# Patient Record
Sex: MALE | Race: AMERICAN INDIAN OR ALASKA NATIVE | Employment: UNEMPLOYED | URBAN - METROPOLITAN AREA
[De-identification: names, ages, dates, MRNs, and addresses within clinical notes are randomized per-mention and may not be internally consistent; named-entity substitution may affect disease eponyms.]

---

## 2017-01-01 ENCOUNTER — TRANSFERRED RECORDS (OUTPATIENT)
Dept: MULTI SPECIALTY CLINIC | Facility: CLINIC | Age: 52
End: 2017-01-01

## 2021-01-07 ENCOUNTER — OFFICE VISIT (OUTPATIENT)
Dept: FAMILY MEDICINE | Facility: CLINIC | Age: 56
End: 2021-01-07
Payer: MEDICAID

## 2021-01-07 ENCOUNTER — TELEPHONE (OUTPATIENT)
Dept: FAMILY MEDICINE | Facility: CLINIC | Age: 56
End: 2021-01-07

## 2021-01-07 VITALS
WEIGHT: 276 LBS | TEMPERATURE: 97.6 F | SYSTOLIC BLOOD PRESSURE: 173 MMHG | OXYGEN SATURATION: 97 % | HEART RATE: 87 BPM | DIASTOLIC BLOOD PRESSURE: 113 MMHG | BODY MASS INDEX: 38.64 KG/M2 | HEIGHT: 71 IN

## 2021-01-07 DIAGNOSIS — E66.01 MORBID OBESITY (H): ICD-10-CM

## 2021-01-07 DIAGNOSIS — I10 HTN, GOAL BELOW 140/90: ICD-10-CM

## 2021-01-07 DIAGNOSIS — E03.9 HYPOTHYROIDISM, UNSPECIFIED TYPE: ICD-10-CM

## 2021-01-07 DIAGNOSIS — E03.9 HYPOTHYROIDISM, UNSPECIFIED TYPE: Primary | ICD-10-CM

## 2021-01-07 DIAGNOSIS — Z12.5 SCREENING FOR PROSTATE CANCER: ICD-10-CM

## 2021-01-07 DIAGNOSIS — E11.9 TYPE 2 DIABETES MELLITUS WITHOUT COMPLICATION, WITHOUT LONG-TERM CURRENT USE OF INSULIN (H): Primary | ICD-10-CM

## 2021-01-07 DIAGNOSIS — E11.49 OTHER DIABETIC NEUROLOGICAL COMPLICATION ASSOCIATED WITH TYPE 2 DIABETES MELLITUS (H): ICD-10-CM

## 2021-01-07 LAB
ALBUMIN SERPL-MCNC: 4 G/DL (ref 3.4–5)
ALP SERPL-CCNC: 109 U/L (ref 40–150)
ALT SERPL W P-5'-P-CCNC: 99 U/L (ref 0–70)
ANION GAP SERPL CALCULATED.3IONS-SCNC: 4 MMOL/L (ref 3–14)
AST SERPL W P-5'-P-CCNC: 53 U/L (ref 0–45)
BILIRUB SERPL-MCNC: 0.4 MG/DL (ref 0.2–1.3)
BUN SERPL-MCNC: 14 MG/DL (ref 7–30)
CALCIUM SERPL-MCNC: 8.8 MG/DL (ref 8.5–10.1)
CHLORIDE SERPL-SCNC: 105 MMOL/L (ref 94–109)
CHOLEST SERPL-MCNC: 261 MG/DL
CO2 SERPL-SCNC: 26 MMOL/L (ref 20–32)
CREAT SERPL-MCNC: 0.77 MG/DL (ref 0.66–1.25)
CREAT UR-MCNC: 231 MG/DL
GFR SERPL CREATININE-BSD FRML MDRD: >90 ML/MIN/{1.73_M2}
GLUCOSE SERPL-MCNC: 167 MG/DL (ref 70–99)
HBA1C MFR BLD: 8.4 % (ref 0–5.6)
HDLC SERPL-MCNC: 33 MG/DL
LDLC SERPL CALC-MCNC: 182 MG/DL
MICROALBUMIN UR-MCNC: 190 MG/L
MICROALBUMIN/CREAT UR: 82.25 MG/G CR (ref 0–17)
NONHDLC SERPL-MCNC: 228 MG/DL
POTASSIUM SERPL-SCNC: 4.4 MMOL/L (ref 3.4–5.3)
PROT SERPL-MCNC: 7.3 G/DL (ref 6.8–8.8)
PSA SERPL-ACNC: 1.48 UG/L (ref 0–4)
SODIUM SERPL-SCNC: 135 MMOL/L (ref 133–144)
T4 FREE SERPL-MCNC: 0.93 NG/DL (ref 0.76–1.46)
TRIGL SERPL-MCNC: 229 MG/DL
TSH SERPL DL<=0.005 MIU/L-ACNC: 12 MU/L (ref 0.4–4)

## 2021-01-07 PROCEDURE — 83036 HEMOGLOBIN GLYCOSYLATED A1C: CPT | Performed by: PHYSICIAN ASSISTANT

## 2021-01-07 PROCEDURE — G0103 PSA SCREENING: HCPCS | Performed by: PHYSICIAN ASSISTANT

## 2021-01-07 PROCEDURE — 36415 COLL VENOUS BLD VENIPUNCTURE: CPT | Performed by: PHYSICIAN ASSISTANT

## 2021-01-07 PROCEDURE — 99204 OFFICE O/P NEW MOD 45 MIN: CPT | Mod: 25 | Performed by: PHYSICIAN ASSISTANT

## 2021-01-07 PROCEDURE — 84443 ASSAY THYROID STIM HORMONE: CPT | Performed by: PHYSICIAN ASSISTANT

## 2021-01-07 PROCEDURE — 80053 COMPREHEN METABOLIC PANEL: CPT | Performed by: PHYSICIAN ASSISTANT

## 2021-01-07 PROCEDURE — 84439 ASSAY OF FREE THYROXINE: CPT | Performed by: PHYSICIAN ASSISTANT

## 2021-01-07 PROCEDURE — 82043 UR ALBUMIN QUANTITATIVE: CPT | Performed by: PHYSICIAN ASSISTANT

## 2021-01-07 PROCEDURE — 80061 LIPID PANEL: CPT | Performed by: PHYSICIAN ASSISTANT

## 2021-01-07 PROCEDURE — 90471 IMMUNIZATION ADMIN: CPT | Performed by: PHYSICIAN ASSISTANT

## 2021-01-07 PROCEDURE — 90715 TDAP VACCINE 7 YRS/> IM: CPT | Performed by: PHYSICIAN ASSISTANT

## 2021-01-07 RX ORDER — GABAPENTIN 300 MG/1
300 CAPSULE ORAL 3 TIMES DAILY
Qty: 90 CAPSULE | Refills: 0 | Status: SHIPPED | OUTPATIENT
Start: 2021-01-07 | End: 2021-02-02

## 2021-01-07 RX ORDER — METFORMIN HCL 500 MG
1000 TABLET, EXTENDED RELEASE 24 HR ORAL 2 TIMES DAILY WITH MEALS
Qty: 360 TABLET | Refills: 0 | Status: SHIPPED | OUTPATIENT
Start: 2021-01-07 | End: 2021-04-06

## 2021-01-07 RX ORDER — HYDROCHLOROTHIAZIDE 25 MG/1
25 TABLET ORAL DAILY
Qty: 90 TABLET | Refills: 0 | Status: SHIPPED | OUTPATIENT
Start: 2021-01-07 | End: 2021-04-06

## 2021-01-07 RX ORDER — LEVOTHYROXINE SODIUM 75 UG/1
75 TABLET ORAL DAILY
Qty: 90 TABLET | Refills: 0 | Status: SHIPPED | OUTPATIENT
Start: 2021-01-07 | End: 2021-04-08

## 2021-01-07 RX ORDER — LISINOPRIL 20 MG/1
20 TABLET ORAL DAILY
Qty: 90 TABLET | Refills: 0 | Status: SHIPPED | OUTPATIENT
Start: 2021-01-07 | End: 2021-04-06

## 2021-01-07 RX ORDER — ATORVASTATIN CALCIUM 20 MG/1
20 TABLET, FILM COATED ORAL DAILY
Qty: 90 TABLET | Refills: 3 | Status: SHIPPED | OUTPATIENT
Start: 2021-01-07 | End: 2021-11-01

## 2021-01-07 ASSESSMENT — MIFFLIN-ST. JEOR: SCORE: 2111.93

## 2021-01-07 NOTE — TELEPHONE ENCOUNTER
Reason for Call:  Other     Detailed comments:  Patient is calling to give Christal Choudhary the amount of his thyroid medication he is taking.  Levothyroxine 75mg.     Phone Number Patient can be reached at: Home number on file [phone] (home)    Best Time: any    Can we leave a detailed message on this number? YES    Call taken on 1/7/2021 at 12:02 PM by Jillian Godinez

## 2021-01-07 NOTE — NURSING NOTE
Due to injection administration, patient instructed to remain in clinic for 15 minutes  afterwards, and to report any adverse reaction to me immediately.  VIS for Tdap given on same date of administration.  Staff signature/Title: CHANTE Andrade MA

## 2021-01-07 NOTE — PROGRESS NOTES
"  Assessment & Plan     Screening for prostate cancer  - Prostate spec antigen screen    Type 2 diabetes mellitus without complication, without long-term current use of insulin (H)  Uncertain status. Patient with SE's form metformin, trial of extended release first then if still with SE's consider alternatives. Start ASA and atorvastatin as well.   - aspirin (ASA) 81 MG EC tablet; Take 1 tablet (81 mg) by mouth daily  - atorvastatin (LIPITOR) 20 MG tablet; Take 1 tablet (20 mg) by mouth daily  - Lipid panel reflex to direct LDL Fasting  - Comprehensive metabolic panel  - Hemoglobin A1c  - Albumin Random Urine Quantitative with Creat Ratio  - metFORMIN (GLUCOPHAGE-XR) 500 MG 24 hr tablet; Take 2 tablets (1,000 mg) by mouth 2 times daily (with meals)  - EYE ADULT REFERRAL; Future    Hypothyroidism, unspecified type  Uncertain levothyroxine dose. Patient will call in with this information. 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 Follow up 4 weeks.   - gabapentin (NEURONTIN) 300 MG capsule; Take 1 capsule (300 mg) by mouth 3 times daily    HTN, goal below 140/90  Uncontrolled. Restart medicaiton. Dosages unknown may need titration.   - lisinopril (ZESTRIL) 20 MG tablet; Take 1 tablet (20 mg) by mouth daily  - hydrochlorothiazide (HYDRODIURIL) 25 MG tablet; Take 1 tablet (25 mg) by mouth daily    Morbid obesity (H)  Has worked on weight loss. Encouraged continued weight loss.                          Tobacco Cessation:   reports that he has been smoking cigars. He has never used smokeless tobacco.      BMI:   Estimated body mass index is 37.96 kg/m  as calculated from the following:    Height as of this encounter: 1.816 m (5' 11.5\").    Weight as of this encounter: 125.2 kg (276 lb).             Return in about 4 weeks (around 2/4/2021) for Diabetes, BP Recheck.    Christal Choudhary PA-C  [facility] " "[facility]    Subjective     Rob [place] is a 55 year old who presents to clinic today for the following health issues     HPI       New Patient/Transfer of Care    Patient is new to me. He had previously been incarcerated in [place], [place].     He ran out of medications a few weeks ago. Dosages of medications is unknown, he brings a medication list with names no dosages.     Refill medications      How many servings of fruits and vegetables do you eat daily?  0-1    On average, how many sweetened beverages do you drink each day (Examples: soda, juice, sweet tea, etc.  Do NOT count diet or artificially sweetened beverages)?   1    How many days per week do you exercise enough to make your heart beat faster? 7    How many minutes a day do you exercise enough to make your heart beat faster? 20 - 29 to 40 minutes  How many days per week do you miss taking your medication?     What makes it hard for you to take your medications?  does not have any medications.    Colonoscopy-had one in 2017- had polyps-2. Was told to come back in a few years.   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 Enrolled in a re entry program.     Neuropathy in feet, some pain in the right hand. It was on duloxetine, not helpful. Didn't care for it. Has not tried gabapentin.   Having headaches, blurry vision. No chest pain. Some shortness of breath with activity. No swelling in the legs.     Review of Systems   Constitutional, HEENT, cardiovascular, pulmonary, gi and gu systems are negative, except as otherwise noted.      Objective    BP (!) 173/113 (BP Location: Left arm, Patient Position: Chair, Cuff Size: Adult Large)   Pulse 87   Temp 97.6  F (36.4  C) (Oral)   Ht 1.816 m (5' 11.5\")   Wt 125.2 kg (276 lb)   SpO2 97%   BMI 37.96 kg/m    Body mass index is 37.96 kg/m .  Physical Exam   GENERAL: healthy, alert and no distress  RESP: lungs clear to auscultation - no rales, rhonchi or wheezes  CV: " regular rate and rhythm, normal S1 S2, no S3 or S4, no murmur, click or rub, no peripheral edema and peripheral pulses strong  MS: no gross musculoskeletal defects noted, no edema

## 2021-01-08 NOTE — RESULT ENCOUNTER NOTE
Rob,     Here is a copy of your labs. It shows that your cholesterol is high and your sugar level is high. We can discuss treatment changes more at your follow up. For now the best treatment is making sure you are taking all your medications everyday. Please let me know if you have any questions.   Christal Choudhary PA-C

## 2021-01-10 ENCOUNTER — HEALTH MAINTENANCE LETTER (OUTPATIENT)
Age: 56
End: 2021-01-10

## 2021-02-02 ENCOUNTER — OFFICE VISIT (OUTPATIENT)
Dept: FAMILY MEDICINE | Facility: CLINIC | Age: 56
End: 2021-02-02
Payer: MEDICAID

## 2021-02-02 VITALS
HEART RATE: 97 BPM | WEIGHT: 274.03 LBS | DIASTOLIC BLOOD PRESSURE: 69 MMHG | SYSTOLIC BLOOD PRESSURE: 129 MMHG | BODY MASS INDEX: 38.36 KG/M2 | OXYGEN SATURATION: 98 % | TEMPERATURE: 98 F | HEIGHT: 71 IN

## 2021-02-02 DIAGNOSIS — E66.01 MORBID OBESITY (H): ICD-10-CM

## 2021-02-02 DIAGNOSIS — E11.49 OTHER DIABETIC NEUROLOGICAL COMPLICATION ASSOCIATED WITH TYPE 2 DIABETES MELLITUS (H): ICD-10-CM

## 2021-02-02 DIAGNOSIS — R06.83 SNORING: Primary | ICD-10-CM

## 2021-02-02 PROCEDURE — 99214 OFFICE O/P EST MOD 30 MIN: CPT | Performed by: PHYSICIAN ASSISTANT

## 2021-02-02 RX ORDER — GABAPENTIN 300 MG/1
300 CAPSULE ORAL 3 TIMES DAILY
Qty: 90 CAPSULE | Refills: 5 | Status: SHIPPED | OUTPATIENT
Start: 2021-02-02 | End: 2021-10-04

## 2021-02-02 ASSESSMENT — PAIN SCALES - GENERAL: PAINLEVEL: MODERATE PAIN (5)

## 2021-02-02 ASSESSMENT — MIFFLIN-ST. JEOR: SCORE: 2089.87

## 2021-02-02 NOTE — PROGRESS NOTES
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 Will plan a recheck in 3 months and adjust medication dosing as needed.   - gabapentin (NEURONTIN) 300 MG capsule; Take 1 capsule (300 mg) by mouth 3 times daily    Snoring  - SLEEP EVALUATION & MANAGEMENT REFERRAL - [facility] [place]  [phone] (Age 15 and up); Future    Morbid obesity (H)  - SLEEP EVALUATION & MANAGEMENT REFERRAL - [facility]  [phone] (Age 15 and up); Future                       Tobacco Cessation:   reports that he has been smoking cigars. He has never used smokeless tobacco.      BMI:   Estimated body mass index is 38.58 kg/m  as calculated from the following:    Height as of this encounter: 1.795 m (5' 10.67\").    Weight as of this encounter: 124.3 kg (274 lb 0.5 oz).             Return in about 2 months (around 4/2/2021) for Diabetes.    Christal Choudhary PA-C  [facility] HODA [place] is a 55 year old who presents to clinic today for the following health issues     HPI       Diabetes Follow-up      How often are you checking your blood sugar? Not at all    What concerns do you have today about your diabetes? None     Do you have any of these symptoms? (Select all that apply)  Numbness in feet, Burning in feet, Blurry vision and Weight loss    Have you had a diabetic eye exam in the last 12 months? No    The time release has been better with the metformin. No GI side effects.   Numbness has improved by about 1/2 but still there.     Fatigue since starting the gabapentin. TSH had been high too.       BP Readings from Last 2 Encounters:   02/02/21 129/69   01/07/21 (!) 173/113     Hemoglobin A1C (%)   Date Value   01/07/2021 8.4 (H)     LDL Cholesterol Calculated (mg/dL)   Date Value   01/07/2021 182 (H)               Hypertension Follow-up      Do you check your " "blood pressure regularly outside of the clinic? No     Are you following a low salt diet? Yes    Are your blood pressures ever more than 140 on the top number (systolic) OR more   than 90 on the bottom number (diastolic), for example 140/90? No- UNKNOWN      How many servings of fruits and vegetables do you eat daily?  2-3 1-2    On average, how many sweetened beverages do you drink each day (Examples: soda, juice, sweet tea, etc.  Do NOT count diet or artificially sweetened beverages)?   1 pop a week    How many days per week do you exercise enough to make your heart beat faster? 5    How many minutes a day do you exercise enough to make your heart beat faster? 10 - 19    How many days per week do you miss taking your medication? 0    Sleep pattern is bad, snores. People have said he does stop breathing at times.     Had a colonoscopy 2017-was given 5 years. Had this while incarcerated.          Review of Systems   Constitutional, HEENT, cardiovascular, pulmonary, gi and gu systems are negative, except as otherwise noted.      Objective    /69 (BP Location: Left arm, Patient Position: Chair, Cuff Size: Adult Large)   Pulse 97   Temp 98  F (36.7  C) (Oral)   Ht 1.795 m (5' 10.67\")   Wt 124.3 kg (274 lb 0.5 oz)   SpO2 98%   BMI 38.58 kg/m    Body mass index is 38.58 kg/m .  Physical Exam   GENERAL: healthy, alert and no distress        \plain        "

## 2021-02-18 ENCOUNTER — OFFICE VISIT (OUTPATIENT)
Dept: OPTOMETRY | Facility: CLINIC | Age: 56
End: 2021-02-18
Payer: MEDICAID

## 2021-02-18 DIAGNOSIS — H52.01 HYPERMETROPIA, RIGHT: ICD-10-CM

## 2021-02-18 DIAGNOSIS — H52.221 REGULAR ASTIGMATISM OF RIGHT EYE: ICD-10-CM

## 2021-02-18 DIAGNOSIS — H25.13 NUCLEAR AGE-RELATED CATARACT, BOTH EYES: ICD-10-CM

## 2021-02-18 DIAGNOSIS — E11.9 TYPE 2 DIABETES MELLITUS WITHOUT RETINOPATHY (H): ICD-10-CM

## 2021-02-18 DIAGNOSIS — Z01.01 ENCOUNTER FOR EXAMINATION OF EYES AND VISION WITH ABNORMAL FINDINGS: Primary | ICD-10-CM

## 2021-02-18 DIAGNOSIS — H52.4 PRESBYOPIA: ICD-10-CM

## 2021-02-18 PROCEDURE — 92004 COMPRE OPH EXAM NEW PT 1/>: CPT | Performed by: OPTOMETRIST

## 2021-02-18 ASSESSMENT — SLIT LAMP EXAM - LIDS
COMMENTS: NORMAL
COMMENTS: NORMAL

## 2021-02-18 ASSESSMENT — CONF VISUAL FIELD
OD_NORMAL: 1
OS_NORMAL: 1

## 2021-02-18 ASSESSMENT — REFRACTION_MANIFEST
OS_ADD: +2.25
OD_ADD: +2.25
OD_AXIS: 014
OS_CYLINDER: SPHERE
OD_CYLINDER: +0.75
OS_SPHERE: PLANO
OD_SPHERE: +0.50

## 2021-02-18 ASSESSMENT — EXTERNAL EXAM - LEFT EYE: OS_EXAM: NORMAL

## 2021-02-18 ASSESSMENT — VISUAL ACUITY
METHOD: SNELLEN - LINEAR
OS_SC: 20/20
OD_SC: 20/30-

## 2021-02-18 ASSESSMENT — TONOMETRY
OD_IOP_MMHG: 21
OS_IOP_MMHG: 20
IOP_METHOD: APPLANATION

## 2021-02-18 ASSESSMENT — CUP TO DISC RATIO
OD_RATIO: 0.25
OS_RATIO: 0.3

## 2021-02-18 NOTE — PATIENT INSTRUCTIONS
Patient educated on importance of good blood sugar control.  Letter sent to primary care provider with diabetic eye exam report.     You have the start of mild cataracts.  You may notice some blurred vision or glare with night driving.  It is important that you wear good sunglasses to protect your eyes from the ultraviolet light from the sun.     Glasses prescription provided today.   I recommend you try wearing bifocal glasses to help your vision at both distance and near. It typically takes at least 2 weeks to adapt to wearing the bifocal style lenses, so allow yourself time to adjust to the new glasses.     Return in 1 year for comprehensive eye exam, or sooner if needed.     The effects of the dilating drops last for 4- 6 hours.  You will be more sensitive to light and vision will be blurry up close.  Mydriatic sunglasses were given if needed.    Josh Hull, OD  [facility] [place]  [address] [place] [address]. [place]  [address]    [phone]

## 2021-02-18 NOTE — LETTER
2/18/2021         RE: Rob SHERIFF Anthony  [address]  [place] [address]        Dear Colleague,    Thank you for referring your patient, Rob [place], to the [facility]. Please see a copy of my visit note below.    Chief Complaint   Patient presents with     Diabetic Eye Exam       Hemoglobin A1C   Date Value Ref Range Status   01/07/2021 8.4 (H) 0 - 5.6 % Final     Comment:     Normal <5.7% Prediabetes 5.7-6.4%  Diabetes 6.5% or higher - adopted from ADA   consensus guidelines.         Last Eye Exam: 1 year  Dilated Previously: Yes    What are you currently using to see?  readers    Distance Vision Acuity: Noticed gradual change in right eye    Near Vision Acuity: Satisfied with vision while reading  with readers, unsure of power of readers    Eye Comfort: good  Do you use eye drops? : No       Medical, surgical and family histories reviewed and updated 2/18/2021.       OBJECTIVE: See Ophthalmology exam    ASSESSMENT:    ICD-10-CM    1. Encounter for examination of eyes and vision with abnormal findings  Z01.01    2. Type 2 diabetes mellitus without retinopathy (H)  E11.9    3. Nuclear age-related cataract, both eyes  H25.13    4. Hypermetropia, right  H52.01    5. Regular astigmatism of right eye  H52.221    6. Presbyopia  H52.4       PLAN:    Rob [place] aware  eye exam results will be sent to Clinic, [facility].  Patient Instructions   Patient educated on importance of good blood sugar control.  Letter sent to primary care provider with diabetic eye exam report.     You have the start of mild cataracts.  You may notice some blurred vision or glare with night driving.  It is important that you wear good sunglasses to protect your eyes from the ultraviolet light from the sun.     Glasses prescription provided today.   I recommend you try wearing bifocal glasses to help your vision at both distance and near. It typically takes at least 2 weeks to adapt to wearing the  bifocal style lenses, so allow yourself time to adjust to the new glasses.     Return in 1 year for comprehensive eye exam, or sooner if needed.     The effects of the dilating drops last for 4- 6 hours.  You will be more sensitive to light and vision will be blurry up close.  Mydriatic sunglasses were given if needed.    Josh Hull, PAYAL  [address]  [place]  [address]    [phone]                 Again, thank you for allowing me to participate in the care of your patient.        Sincerely,        Josh Hull OD

## 2021-02-18 NOTE — PROGRESS NOTES
Chief Complaint   Patient presents with     Diabetic Eye Exam       Hemoglobin A1C   Date Value Ref Range Status   01/07/2021 8.4 (H) 0 - 5.6 % Final     Comment:     Normal <5.7% Prediabetes 5.7-6.4%  Diabetes 6.5% or higher - adopted from ADA   consensus guidelines.         Last Eye Exam: 1 year  Dilated Previously: Yes    What are you currently using to see?  readers    Distance Vision Acuity: Noticed gradual change in right eye    Near Vision Acuity: Satisfied with vision while reading  with readers, unsure of power of readers    Eye Comfort: good  Do you use eye drops? : No       Medical, surgical and family histories reviewed and updated 2/18/2021.       OBJECTIVE: See Ophthalmology exam    ASSESSMENT:    ICD-10-CM    1. Encounter for examination of eyes and vision with abnormal findings  Z01.01    2. Type 2 diabetes mellitus without retinopathy (H)  E11.9    3. Nuclear age-related cataract, both eyes  H25.13    4. Hypermetropia, right  H52.01    5. Regular astigmatism of right eye  H52.221    6. Presbyopia  H52.4       PLAN:    Rob Cruz aware  eye exam results will be sent to Clinic, [facility].  Patient Instructions   Patient educated on importance of good blood sugar control.  Letter sent to primary care provider with diabetic eye exam report.     You have the start of mild cataracts.  You may notice some blurred vision or glare with night driving.  It is important that you wear good sunglasses to protect your eyes from the ultraviolet light from the sun.     Glasses prescription provided today.   I recommend you try wearing bifocal glasses to help your vision at both distance and near. It typically takes at least 2 weeks to adapt to wearing the bifocal style lenses, so allow yourself time to adjust to the new glasses.     Return in 1 year for comprehensive eye exam, or sooner if needed.     The effects of the dilating drops last for 4- 6 hours.  You will be more sensitive to light and vision  will be blurry up close.  Mydriatic sunglasses were given if needed.    Josh Hull, PAYAL  [address]  [place]  [address] [phone]

## 2021-03-05 ENCOUNTER — APPOINTMENT (OUTPATIENT)
Dept: OPTOMETRY | Facility: CLINIC | Age: 56
End: 2021-03-05
Payer: COMMERCIAL

## 2021-03-05 PROCEDURE — 92341 FIT SPECTACLES BIFOCAL: CPT | Performed by: OPTOMETRIST

## 2021-04-05 DIAGNOSIS — I10 HTN, GOAL BELOW 140/90: ICD-10-CM

## 2021-04-05 DIAGNOSIS — E11.9 TYPE 2 DIABETES MELLITUS WITHOUT COMPLICATION, WITHOUT LONG-TERM CURRENT USE OF INSULIN (H): ICD-10-CM

## 2021-04-05 DIAGNOSIS — E03.9 HYPOTHYROIDISM, UNSPECIFIED TYPE: ICD-10-CM

## 2021-04-06 ENCOUNTER — OFFICE VISIT (OUTPATIENT)
Dept: FAMILY MEDICINE | Facility: CLINIC | Age: 56
End: 2021-04-06
Payer: COMMERCIAL

## 2021-04-06 VITALS
BODY MASS INDEX: 38.43 KG/M2 | DIASTOLIC BLOOD PRESSURE: 74 MMHG | OXYGEN SATURATION: 98 % | WEIGHT: 273 LBS | HEART RATE: 103 BPM | SYSTOLIC BLOOD PRESSURE: 128 MMHG | TEMPERATURE: 98.7 F

## 2021-04-06 DIAGNOSIS — E11.9 TYPE 2 DIABETES MELLITUS WITHOUT COMPLICATION, WITHOUT LONG-TERM CURRENT USE OF INSULIN (H): Primary | ICD-10-CM

## 2021-04-06 DIAGNOSIS — E03.9 HYPOTHYROIDISM, UNSPECIFIED TYPE: ICD-10-CM

## 2021-04-06 DIAGNOSIS — I10 HTN, GOAL BELOW 140/90: ICD-10-CM

## 2021-04-06 PROBLEM — F10.11 NONDEPENDENT ALCOHOL ABUSE, IN REMISSION: Status: ACTIVE | Noted: 2021-04-06

## 2021-04-06 PROBLEM — F17.200 TOBACCO USE DISORDER: Status: ACTIVE | Noted: 2021-04-06

## 2021-04-06 PROBLEM — E11.49 OTHER DIABETIC NEUROLOGICAL COMPLICATION ASSOCIATED WITH TYPE 2 DIABETES MELLITUS (H): Chronic | Status: ACTIVE | Noted: 2021-01-07

## 2021-04-06 PROBLEM — E66.01 MORBID OBESITY (H): Chronic | Status: ACTIVE | Noted: 2021-01-07

## 2021-04-06 PROBLEM — F19.20 CHEMICAL DEPENDENCY (H): Status: ACTIVE | Noted: 2021-04-06

## 2021-04-06 LAB — HBA1C MFR BLD: 7.1 % (ref 0–5.6)

## 2021-04-06 PROCEDURE — 36415 COLL VENOUS BLD VENIPUNCTURE: CPT | Performed by: PHYSICIAN ASSISTANT

## 2021-04-06 PROCEDURE — 84443 ASSAY THYROID STIM HORMONE: CPT | Performed by: PHYSICIAN ASSISTANT

## 2021-04-06 PROCEDURE — 80048 BASIC METABOLIC PNL TOTAL CA: CPT | Performed by: PHYSICIAN ASSISTANT

## 2021-04-06 PROCEDURE — 83036 HEMOGLOBIN GLYCOSYLATED A1C: CPT | Performed by: PHYSICIAN ASSISTANT

## 2021-04-06 PROCEDURE — 99214 OFFICE O/P EST MOD 30 MIN: CPT | Performed by: PHYSICIAN ASSISTANT

## 2021-04-06 PROCEDURE — 84439 ASSAY OF FREE THYROXINE: CPT | Performed by: PHYSICIAN ASSISTANT

## 2021-04-06 RX ORDER — HYDROCHLOROTHIAZIDE 25 MG/1
25 TABLET ORAL DAILY
Qty: 90 TABLET | Refills: 3 | Status: SHIPPED | OUTPATIENT
Start: 2021-04-06 | End: 2022-04-06

## 2021-04-06 RX ORDER — LISINOPRIL 20 MG/1
20 TABLET ORAL DAILY
Qty: 90 TABLET | Refills: 3 | Status: SHIPPED | OUTPATIENT
Start: 2021-04-06 | End: 2022-04-06

## 2021-04-06 RX ORDER — METFORMIN HCL 500 MG
1000 TABLET, EXTENDED RELEASE 24 HR ORAL 2 TIMES DAILY WITH MEALS
Qty: 360 TABLET | Refills: 3 | Status: SHIPPED | OUTPATIENT
Start: 2021-04-06 | End: 2022-04-06

## 2021-04-06 NOTE — PROGRESS NOTES
"    Assessment & Plan     Type 2 diabetes mellitus without complication, without long-term current use of insulin (H)  Recheck today. Tolerating metformin well.   - Hemoglobin A1c  - metFORMIN (GLUCOPHAGE-XR) 500 MG 24 hr tablet; Take 2 tablets (1,000 mg) by mouth 2 times daily (with meals)    Hypothyroidism, unspecified type  Recheck, if TSH still high will increase dose to 88mcg  - TSH with free T4 reflex    HTN, goal below 140/90  Stable. Check electrolytes.   - Basic metabolic panel  - hydrochlorothiazide (HYDRODIURIL) 25 MG tablet; Take 1 tablet (25 mg) by mouth daily  - lisinopril (ZESTRIL) 20 MG tablet; Take 1 tablet (20 mg) by mouth daily             Tobacco Cessation:   reports that he has been smoking cigars. He has never used smokeless tobacco.  Tobacco Cessation Action Plan: Information offered: Patient not interested at this time    BMI:   Estimated body mass index is 38.43 kg/m  as calculated from the following:    Height as of 2/2/21: 1.795 m (5' 10.67\").    Weight as of this encounter: 123.8 kg (273 lb).   Weight management plan: Discussed healthy diet and exercise guidelines        No follow-ups on file.    Christal Choudhary PA-C  [facility] HOD[place] is a 55 year old who presents for the following health issues     HPI     Diabetes Follow-up      How often are you checking your blood sugar? Not at all    What concerns do you have today about your diabetes? None     Do you have any of these symptoms? (Select all that apply)  Numbness in feet and Burning in feet      BP Readings from Last 2 Encounters:   04/06/21 (!) 147/87   02/02/21 129/69     Hemoglobin A1C (%)   Date Value   01/07/2021 8.4 (H)     LDL Cholesterol Calculated (mg/dL)   Date Value   01/07/2021 182 (H)                 How many servings of fruits and vegetables do you eat daily?  2-3    On average, how many sweetened beverages do you drink each day (Examples: soda, juice, sweet tea, etc.  Do NOT count " diet or artificially sweetened beverages)?   1 once in a while     How many days per week do you exercise enough to make your heart beat faster? 5    How many minutes a day do you exercise enough to make your heart beat faster? 30 - 60    How many days per week do you miss taking your medication? 0    He is still fatigued. Not really associated with his gabapentin.   He is still having pain throughout the day. He able to tolerate it at this time. Some days worse than others. Takes Tylenol 1000mg BID.       Review of Systems   Constitutional, HEENT, cardiovascular, pulmonary, gi and gu systems are negative, except as otherwise noted.      Objective    BP (!) 147/87 (BP Location: Right arm, Patient Position: Chair, Cuff Size: Adult Large)   Pulse 103   Temp 98.7  F (37.1  C) (Oral)   Wt 123.8 kg (273 lb)   SpO2 98%   BMI 38.43 kg/m    Body mass index is 38.43 kg/m .  Physical Exam   GENERAL: healthy, alert and no distress  Diabetic foot exam: normal DP and PT pulses, no trophic changes or ulcerative lesions and normal sensory exam

## 2021-04-06 NOTE — PROGRESS NOTES
Rob is a 55 year old who is being evaluated via a billable video visit.      How would you like to obtain your AVS? MyChart  If the video visit is dropped, the invitation should be resent by: Text to cell phone: [phone]  Will anyone else be joining your video visit? Kaitlynn Kaur MA    Video Start Time: 3:34 PM  Video-Visit Details    Type of service:  Video Visit    Video End Time:3:55 PM    Originating Location (pt. Location): Home    Distant Location (provider location):  [facility]     Platform used for Video Visit: Source[employer]       Sleep Consultation:    Date on this visit: 4/7/2021    Primary Physician: Cj Germain     Chief Complaint   Patient presents with     Consult     Difficulty staying asleep at night       Rob Cruz is a 55 year old male with medical history remarkable for hypertension, diabetes type 2, hypothyroidism, hyperlipidemia and obesity. Rob is sent by Christal Choudhary for a sleep consultation regarding snoring.    Rob goes to sleep at 10:00 PM during the week. He wakes up at 5:30 AM with an alarm. He falls asleep in 30 minutes.  Rob denies difficulty falling asleep.  He wakes up 6 times a night for 5 minutes before falling back to sleep.  Rob wakes up to uncertain reasons.  On weekends, Rob goes to sleep at 12:00 AM.  He wakes up at 6:00 AM without an alarm. He falls asleep in 30 minutes.  Patient gets an average of 6 hours of sleep per night. He does not always feel refreshed.     Patient does watch TV in bed.     Rob does not do shift work.      Rob does snore every night and snoring is loud. Patient does not have a regular bed partner. There is report of snoring and gasping.  He does have witnessed apneas  Patient sleeps on his side. He has no morning headaches, morning confusion and restless legs,. Rob denies any bruxism, sleep walking, dream enactment, sleep paralysis, cataplexy and hypnogogic/hypnopompic  hallucinations. He does sleep talk.     Rob denies difficulty breathing through his nose.      Rob has gained 80 pounds in 20 years.  Patient describes themself as a morning person.  He would prefer to go to sleep at 10:00 PM and wake up at 6:00 AM.  Patient's [place] Sleepiness score 0/24 inconsistent with excessive  daytime sleepiness.  He reports fatigue.     Rob naps 0 times per week.  He takes some inadvertant naps.  Patient was counseled on the importance of driving while alert, to pull over if drowsy, or nap before getting into the vehicle if sleepy.  He uses 1 cup of coffee per day.    Allergies:    No Known Allergies    Medications:    Current Outpatient Medications   Medication Sig Dispense Refill     aspirin (ASA) 81 MG EC tablet Take 1 tablet (81 mg) by mouth daily 90 tablet 3     atorvastatin (LIPITOR) 20 MG tablet Take 1 tablet (20 mg) by mouth daily 90 tablet 3     gabapentin (NEURONTIN) 300 MG capsule Take 1 capsule (300 mg) by mouth 3 times daily 90 capsule 5     levothyroxine (SYNTHROID/LEVOTHROID) 75 MCG tablet Take 1 tablet (75 mcg) by mouth daily 90 tablet 0     zolpidem (AMBIEN) 5 MG tablet Take tablet by mouth 15 minutes prior to sleep, for Sleep Study 1 tablet 0     hydrochlorothiazide (HYDRODIURIL) 25 MG tablet Take 1 tablet (25 mg) by mouth daily 90 tablet 3     lisinopril (ZESTRIL) 20 MG tablet Take 1 tablet (20 mg) by mouth daily 90 tablet 3     metFORMIN (GLUCOPHAGE-XR) 500 MG 24 hr tablet Take 2 tablets (1,000 mg) by mouth 2 times daily (with meals) 360 tablet 3       Problem List:  Patient Active Problem List    Diagnosis Date Noted     Chemical dependency (H) 04/06/2021     Priority: Medium     quit 2001       HTN (hypertension), benign 04/06/2021     Priority: Medium     Nondependent alcohol abuse, in remission 04/06/2021     Priority: Medium     Sober        Tobacco use disorder 04/06/2021     Priority: Medium     Type 2 diabetes mellitus without complication, without  long-term current use of insulin (H) 01/07/2021     Priority: Medium     Hypothyroidism, unspecified type 01/07/2021     Priority: Medium     Other diabetic neurological complication associated with type 2 diabetes mellitus (H) 01/07/2021     Priority: Medium     Morbid obesity (H) 01/07/2021     Priority: Medium     Nontoxic multinodular goiter 09/13/2016     Priority: Medium     04/2016: US: Stable multinodular Goiter. Diffuse nodular replacement of both lobes. Complex cystic 2 cm nodule inf left lobe and dominant 2 cm nodule Inf R lobe.  09/2016: US: No change. FNA Lymphocytic thyroiditis       Mixed hyperlipidemia 12/02/2009     Priority: Medium        Past Medical/Surgical History:  Past Medical History:   Diagnosis Date     Diabetes mellitus, type 2 (H)      Past Surgical History:   Procedure Laterality Date     COLONOSCOPY  2017     PE TUBES       TONSILLECTOMY         Social History:  Social History     Socioeconomic History     Marital status: Single     Spouse name: Not on file     Number of children: Not on file     Years of education: Not on file     Highest education level: Not on file   Occupational History     Not on file   Social Needs     Financial resource strain: Not on file     Food insecurity     Worry: Not on file     Inability: Not on file     Transportation needs     Medical: Not on file     Non-medical: Not on file   Tobacco Use     Smoking status: Current Every Day Smoker     Types: Cigars     Smokeless tobacco: Never Used   Substance and Sexual Activity     Alcohol use: Not Currently     Drug use: Not Currently     Sexual activity: Not on file   Lifestyle     Physical activity     Days per week: Not on file     Minutes per session: Not on file     Stress: Not on file   Relationships     Social connections     Talks on phone: Not on file     Gets together: Not on file     Attends Yazidism service: Not on file     Active member of club or organization: Not on file     Attends meetings of  "clubs or organizations: Not on file     Relationship status: Not on file     Intimate partner violence     Fear of current or ex partner: Not on file     Emotionally abused: Not on file     Physically abused: Not on file     Forced sexual activity: Not on file   Other Topics Concern     Not on file   Social History Narrative     Not on file       Family History:  Family History   Problem Relation Age of Onset     Coronary Artery Disease Mother      Coronary Artery Disease Father      Diabetes Maternal Grandmother        Review of Systems:  A complete review of systems reviewed by me is negative with the exeption of what has been mentioned in the history of present illness.  CONSTITUTIONAL:  POSITIVE for  weight gain  EYES: NEGATIVE for changes in vision, blind spots, double vision.  ENT: NEGATIVE for ear pain, sore throat, sinus pain, post-nasal drip, runny nose, bloody nose  CARDIAC: NEGATIVE for fast heartbeats or fluttering in chest, chest pain or pressure, breathlessness when lying flat, swollen legs or swollen feet.  NEUROLOGIC: NEGATIVE headaches, weakness or numbness in the arms or legs.  DERMATOLOGIC: NEGATIVE for rashes, new moles or change in mole(s)  PULMONARY: NEGATIVE SOB at rest, SOB with activity, dry cough, productive cough, coughing up blood, wheezing or whistling when breathing.    GASTROINTESTINAL: NEGATIVE for nausea or vomitting, loose or watery stools, fat or grease in stools, constipation, abdominal pain, bowel movements black in color or blood noted.  GENITOURINARY: NEGATIVE for pain during urination, blood in urine, urinating more frequently than usual, irregular menstrual periods.  MUSCULOSKELETAL: NEGATIVE for muscle pain, bone or joint pain, swollen joints.  ENDOCRINE: NEGATIVE for increased thirst or urination, diabetes.  LYMPHATIC: NEGATIVE for swollen lymph nodes, lumps or bumps in the breasts or nipple discharge.    Physical Examination:  Vitals: Ht 1.795 m (5' 10.67\")   Wt 123.8 kg " (273 lb)   BMI 38.43 kg/m    BMI= Body mass index is 38.43 kg/m .         [place] Total Score 4/6/2021   Total score - [place] 0       CATE Total Score: 28 (04/06/21 1103)    GENERAL APPEARANCE: alert and no distress  EYES: Eyes grossly normal to inspection  RESP: breathing is non-labored  NEURO: mentation intact and speech normal  PSYCH: affect normal/bright    Last Comprehensive Metabolic Panel:  Sodium   Date Value Ref Range Status   04/06/2021 134 133 - 144 mmol/L Final     Potassium   Date Value Ref Range Status   04/06/2021 3.8 3.4 - 5.3 mmol/L Final     Chloride   Date Value Ref Range Status   04/06/2021 104 94 - 109 mmol/L Final     Carbon Dioxide   Date Value Ref Range Status   04/06/2021 22 20 - 32 mmol/L Final     Anion Gap   Date Value Ref Range Status   04/06/2021 8 3 - 14 mmol/L Final     Glucose   Date Value Ref Range Status   04/06/2021 127 (H) 70 - 99 mg/dL Final     Comment:     Non Fasting     Urea Nitrogen   Date Value Ref Range Status   04/06/2021 17 7 - 30 mg/dL Final     Creatinine   Date Value Ref Range Status   04/06/2021 0.96 0.66 - 1.25 mg/dL Final     GFR Estimate   Date Value Ref Range Status   04/06/2021 87 >60 mL/min/[1.73_m2] Final     Comment:     Non  GFR Calc  Starting 12/18/2018, serum creatinine based estimated GFR (eGFR) will be   calculated using the Chronic Kidney Disease Epidemiology Collaboration   (CKD-EPI) equation.       Calcium   Date Value Ref Range Status   04/06/2021 8.3 (L) 8.5 - 10.1 mg/dL Final     TSH   Date Value Ref Range Status   04/06/2021 7.18 (H) 0.40 - 4.00 mU/L Final     Impression:  Patient has features and risk factors for possible obstructive sleep apnea including: BMI of 38, loud snoring, witnessed apnea, non-refreshing sleep,daytime fatigue, difficulty maintaining sleep and co-morbid HTN and DM type 2. The STOP-BANG score is 7/8. The pathophysiology, diagnosis and treatment of CLARENCE was discussed and a handout was provided.   Plan:    1.  Polysomnogram (using 4% desaturation/Medicare/ AASM 1B scoring rules) for high probability obstructive sleep apnea. Ambien if needed.   2. Advised him against drowsy driving.    3. Recommend weight management.     Literature provided regarding sleep apnea.      He will follow up with me in approximately two weeks after his sleep study has been competed to review the results and discuss plan of care.       Polysomnography reviewed.  Obstructive sleep apnea reviewed.  Complications of untreated sleep apnea were reviewed.    Yolanda Ty PA-C    CC: Christal Choudhary

## 2021-04-07 ENCOUNTER — VIRTUAL VISIT (OUTPATIENT)
Dept: SLEEP MEDICINE | Facility: CLINIC | Age: 56
End: 2021-04-07
Attending: PHYSICIAN ASSISTANT
Payer: COMMERCIAL

## 2021-04-07 VITALS — WEIGHT: 273 LBS | BODY MASS INDEX: 38.22 KG/M2 | HEIGHT: 71 IN

## 2021-04-07 DIAGNOSIS — G47.30 SLEEP APNEA, UNSPECIFIED TYPE: ICD-10-CM

## 2021-04-07 DIAGNOSIS — Z72.820 LACK OF ADEQUATE SLEEP: ICD-10-CM

## 2021-04-07 DIAGNOSIS — I10 ESSENTIAL HYPERTENSION: ICD-10-CM

## 2021-04-07 DIAGNOSIS — R06.00 DYSPNEA AND RESPIRATORY ABNORMALITY: ICD-10-CM

## 2021-04-07 DIAGNOSIS — R53.81 MALAISE AND FATIGUE: ICD-10-CM

## 2021-04-07 DIAGNOSIS — E66.01 MORBID OBESITY (H): ICD-10-CM

## 2021-04-07 DIAGNOSIS — R06.89 DYSPNEA AND RESPIRATORY ABNORMALITY: ICD-10-CM

## 2021-04-07 DIAGNOSIS — R53.83 MALAISE AND FATIGUE: ICD-10-CM

## 2021-04-07 DIAGNOSIS — R06.83 SNORING: ICD-10-CM

## 2021-04-07 DIAGNOSIS — F10.11 NONDEPENDENT ALCOHOL ABUSE, IN REMISSION: ICD-10-CM

## 2021-04-07 LAB
ANION GAP SERPL CALCULATED.3IONS-SCNC: 8 MMOL/L (ref 3–14)
BUN SERPL-MCNC: 17 MG/DL (ref 7–30)
CALCIUM SERPL-MCNC: 8.3 MG/DL (ref 8.5–10.1)
CHLORIDE SERPL-SCNC: 104 MMOL/L (ref 94–109)
CO2 SERPL-SCNC: 22 MMOL/L (ref 20–32)
CREAT SERPL-MCNC: 0.96 MG/DL (ref 0.66–1.25)
GFR SERPL CREATININE-BSD FRML MDRD: 87 ML/MIN/{1.73_M2}
GLUCOSE SERPL-MCNC: 127 MG/DL (ref 70–99)
POTASSIUM SERPL-SCNC: 3.8 MMOL/L (ref 3.4–5.3)
SODIUM SERPL-SCNC: 134 MMOL/L (ref 133–144)
T4 FREE SERPL-MCNC: 1.16 NG/DL (ref 0.76–1.46)
TSH SERPL DL<=0.005 MIU/L-ACNC: 7.18 MU/L (ref 0.4–4)

## 2021-04-07 PROCEDURE — 99203 OFFICE O/P NEW LOW 30 MIN: CPT | Mod: 95 | Performed by: PHYSICIAN ASSISTANT

## 2021-04-07 RX ORDER — HYDROCHLOROTHIAZIDE 25 MG/1
TABLET ORAL
Qty: 90 TABLET | Refills: 0 | OUTPATIENT
Start: 2021-04-07

## 2021-04-07 RX ORDER — ZOLPIDEM TARTRATE 5 MG/1
TABLET ORAL
Qty: 1 TABLET | Refills: 0 | Status: SHIPPED | OUTPATIENT
Start: 2021-04-07 | End: 2021-11-01

## 2021-04-07 RX ORDER — LISINOPRIL 20 MG/1
TABLET ORAL
Qty: 90 TABLET | Refills: 0 | OUTPATIENT
Start: 2021-04-07

## 2021-04-07 RX ORDER — METFORMIN HCL 500 MG
TABLET, EXTENDED RELEASE 24 HR ORAL
Qty: 360 TABLET | Refills: 0 | OUTPATIENT
Start: 2021-04-07

## 2021-04-07 ASSESSMENT — MIFFLIN-ST. JEOR: SCORE: 2085.21

## 2021-04-07 NOTE — PATIENT INSTRUCTIONS
Your BMI is Body mass index is 38.43 kg/m .  Weight management is a personal decision.  If you are interested in exploring weight loss strategies, the following discussion covers the approaches that may be successful. Body mass index (BMI) is one way to tell whether you are at a healthy weight, overweight, or obese. It measures your weight in relation to your height.  A BMI of 18.5 to 24.9 is in the healthy range. A person with a BMI of 25 to 29.9 is considered overweight, and someone with a BMI of 30 or greater is considered obese. More than two-thirds of American adults are considered overweight or obese.  Being overweight or obese increases the risk for further weight gain. Excess weight may lead to heart disease and diabetes.  Creating and following plans for healthy eating and physical activity may help you improve your health.  Weight control is part of healthy lifestyle and includes exercise, emotional health, and healthy eating habits. Careful eating habits lifelong are the mainstay of weight control. Though there are significant health benefits from weight loss, long-term weight loss with diet alone may be very difficult to achieve- studies show long-term success with dietary management in less than 10% of people. Attaining a healthy weight may be especially difficult to achieve in those with severe obesity. In some cases, medications, devices and surgical management might be considered.  What can you do?  If you are overweight or obese and are interested in methods for weight loss, you should discuss this with your provider.     Consider reducing daily calorie intake by 500 calories.     Keep a food journal.     Avoiding skipping meals, consider cutting portions instead.    Diet combined with exercise helps maintain muscle while optimizing fat loss. Strength training is particularly important for building and maintaining muscle mass. Exercise helps reduce stress, increase energy, and improves fitness.  Increasing exercise without diet control, however, may not burn enough calories to loose weight.       Start walking three days a week 10-20 minutes at a time    Work towards walking thirty minutes five days a week     Eventually, increase the speed of your walking for 1-2 minutes at time    In addition, we recommend that you review healthy lifestyles and methods for weight loss available through the National Institutes of Health patient information sites:  http://win.niddk.nih.gov/publications/index.htm    And look into health and wellness programs that may be available through your health insurance provider, employer, local community center, or merry club.    Weight management plan: Patient was referred to their PCP to discuss a diet and exercise plan.

## 2021-04-07 NOTE — TELEPHONE ENCOUNTER
Routing refill request to provider for review/approval because:  Labs out of range:    TSH   Date Value Ref Range Status   01/07/2021 12.00 (H) 0.40 - 4.00 mU/L Final       Latosha Gibson, RN, BSN, PHN  [facility]: [place]

## 2021-04-08 DIAGNOSIS — E03.9 HYPOTHYROIDISM, UNSPECIFIED TYPE: ICD-10-CM

## 2021-04-08 RX ORDER — LEVOTHYROXINE SODIUM 75 UG/1
75 TABLET ORAL DAILY
Qty: 90 TABLET | Refills: 3 | Status: SHIPPED | OUTPATIENT
Start: 2021-04-08 | End: 2022-04-06

## 2021-04-08 RX ORDER — LEVOTHYROXINE SODIUM 75 UG/1
TABLET ORAL
Qty: 90 TABLET | Refills: 0 | OUTPATIENT
Start: 2021-04-08

## 2021-04-08 NOTE — RESULT ENCOUNTER NOTE
Rob,     Your sugar level has improved. It is still slightly above your goal of less than 7.0 but I am not going to make any changes right now to your medications. I would encourage you to work on your diet- avoiding the full sugar gatorades etc. And we will recheck in 3 months.   Your thyroid is much closer to normal. I am going to keep you on your same dose and I sent refills to the pharmacy. Let me know if you have questions.   Christal Choudhary PA-C

## 2021-05-21 ENCOUNTER — THERAPY VISIT (OUTPATIENT)
Dept: SLEEP MEDICINE | Facility: CLINIC | Age: 56
End: 2021-05-21
Payer: COMMERCIAL

## 2021-05-21 DIAGNOSIS — R06.83 SNORING: ICD-10-CM

## 2021-05-21 DIAGNOSIS — R53.83 MALAISE AND FATIGUE: ICD-10-CM

## 2021-05-21 DIAGNOSIS — R06.00 DYSPNEA AND RESPIRATORY ABNORMALITY: ICD-10-CM

## 2021-05-21 DIAGNOSIS — E66.01 MORBID OBESITY (H): ICD-10-CM

## 2021-05-21 DIAGNOSIS — G47.30 SLEEP APNEA, UNSPECIFIED TYPE: ICD-10-CM

## 2021-05-21 DIAGNOSIS — R53.81 MALAISE AND FATIGUE: ICD-10-CM

## 2021-05-21 DIAGNOSIS — I10 ESSENTIAL HYPERTENSION: ICD-10-CM

## 2021-05-21 DIAGNOSIS — Z72.820 LACK OF ADEQUATE SLEEP: ICD-10-CM

## 2021-05-21 DIAGNOSIS — R06.89 DYSPNEA AND RESPIRATORY ABNORMALITY: ICD-10-CM

## 2021-05-21 PROCEDURE — 95810 POLYSOM 6/> YRS 4/> PARAM: CPT | Performed by: INTERNAL MEDICINE

## 2021-05-24 PROBLEM — G47.33 OSA (OBSTRUCTIVE SLEEP APNEA): Chronic | Status: ACTIVE | Noted: 2021-05-24

## 2021-05-24 NOTE — PROCEDURES
" SLEEP STUDY INTERPRETATION  DIAGNOSTIC POLYSOMNOGRAPHY REPORT      Patient: BYRON MARRERO  YOB: 1965  Study Date: 5/21/2021  MRN: [id number]  Referring Provider: -  Ordering Provider: Yolanda Ty PA-C    Indications for Polysomnography: The patient is a 55 year old Male who is 5' 10\" and weighs 273.0 lbs. His BMI is 38.6, [place] sleepiness scale 0 and neck circumference is 44 cm. A diagnostic polysomnogram was performed to evaluate for  loud snoring, witnessed apnea, non-refreshing sleep, daytime fatigue, difficulty maintaining sleep and co-morbid HTN and DM type 2.    Polysomnogram Data: A full night polysomnogram recorded the standard physiologic parameters including EEG, EOG, EMG, ECG, nasal and oral airflow. Respiratory parameters of chest and abdominal movements were recorded with respiratory inductance plethysmography. Oxygen saturation was recorded by pulse oximetry. Hypopnea scoring rule used: 1B 4%.    Sleep Architecture:   The total recording time of the polysomnogram was 448.6 minutes. The total sleep time was 386.0 minutes. Sleep latency was normal at 19.9 minutes without the use of a sleep aid. REM latency was 123.0 minutes. Arousal index was increased at 35.9 arousals per hour. Sleep efficiency was normal at 86.1%. Wake after sleep onset was 41.5 minutes. The patient spent 12.0% of total sleep time in Stage N1, 41.8% in Stage N2, 25.4% in Stage N3, and 20.7% in REM. Time in REM supine was 16.0 minutes.    Respiration:     Events ? The polysomnogram revealed a presence of 16 obstructive, 0 central, and 1 mixed apneas resulting in an apnea index of 2.6 events per hour. There were 61 obstructive hypopneas and -0central hypopneas resulting in an obstructive hypopnea index of 9.5 and central hypopnea index of 0 events per hour. The combined apnea/hypopnea index was 12.1 events per hour (central apnea/hypopnea index was 0 events per hour). The REM AHI was 30.0 events per hour. The supine " AHI was 18.9 events per hour. The RERA index was 9.8 events per hour.  The RDI was 21.9 events per hour.    Snoring - was reported as mild to moderate    Respiratory rate and pattern - was notable for normal respiratory rate and pattern.    Sustained Sleep Associated Hypoventilation - Transcutaneous carbon dioxide monitoring was not used, however significant hypoventilation was not suggested by oximetry    Sleep Associated Hypoxemia - (Greater than 5 minutes O2 sat at or below 88%) was not present. Baseline oxygen saturation was 92.5%. Lowest oxygen saturation was 82.1%. Time spent less than or equal to 88% was 2.9 minutes. Time spent less than or equal to 89% was 4.8 minutes.    Movement Activity:     Periodic Limb Activity - There were 0 PLMs during the entire study.     REM EMG Activity - Excessive transient/sustained muscle activity was not present.    Nocturnal Behavior - Abnormal sleep related behaviors were not noted     Bruxism - None apparent.    Cardiac Summary:   The average pulse rate was 77.2 bpm. The minimum pulse rate was 35.9 bpm while the maximum pulse rate was 98.0 bpm.  Arrhythmias were not noted.        Assessment:     Mild obstructive sleep apnea, moderate by RDI    Recommendations:    Based on the presence of mild/moderate obstructive sleep apnea and symptoms or comorbidities, treatment could be empirically initiated with Auto?titrating PAP therapy with a range of 5 to 15 cmH2O. Recommend clinical follow up with sleep management team.    Patient may be a candidate for dental appliance through referral to Sleep Dentistry for the treatment of obstructive sleep apnea and/or socially disruptive snoring.    If devices are not acceptable or effective, patient may benefit from evaluation of possible surgical options. If he is interested, would recommend referral to specialized ENT-Sleep provider.    Advice regarding the risks of drowsy driving.    Suggest optimizing sleep schedule and avoiding sleep  deprivation.    Weight management (if BMI > 30).    Diagnostic Codes:   Obstructive Sleep Apnea G47.33        _____________________________________   Electronically Signed By: Santiago Dennis MD 5/24/21           Range(%) Time in range (min)   0.0 - 89.0 4.8   0.0 - 88.0 2.9         Stage Min(mm Hg) Max(mm Hg)   Wake - -   NREM(1+2+3) - -   REM - -       Range(mmHg) Time in range (min)   55.0 - 100.0 -   Excluded data <20.0 & >65.0 449.0

## 2021-05-27 LAB — SLPCOMP: NORMAL

## 2021-06-02 NOTE — PATIENT INSTRUCTIONS

## 2021-06-02 NOTE — PROGRESS NOTES
Rob is a 55 year old who is being evaluated via a billable video visit.      How would you like to obtain your AVS? MyChart  If the video visit is dropped, the invitation should be resent by: Send to e-mail at:   Will anyone else be joining your video visit? Kaitlynn Cade MA on 6/2/2021 at 2:16 PM    Video Start Time: 2:35 PM  Video-Visit Details    Type of service:  Video Visit    Video End Time:2:47 PM    Originating Location (pt. Location): Home    Distant Location (provider location):  [facility]     Platform used for Video Visit: Doximity       Sleep Study Follow-Up Visit:    Date on this visit: 6/3/2021    Rob Cruz comes in today for follow-up of his sleep study done on 5/21/2021 at the [facility]. A diagnostic polysomnogram was performed to evaluate for  loud snoring, witnessed apnea, non-refreshing sleep, daytime fatigue, difficulty maintaining sleep and co-morbid HTN and DM type 2.    Polysomnogram interpreted by Dr Dennis:  Polysomnogram Data: A full night polysomnogram recorded the standard physiologic parameters including EEG, EOG, EMG, ECG, nasal and oral airflow. Respiratory parameters of chest and abdominal movements were recorded with respiratory inductance plethysmography. Oxygen saturation was recorded by pulse oximetry. Hypopnea scoring rule used: 1B 4%.     Sleep Architecture:   The total recording time of the polysomnogram was 448.6 minutes. The total sleep time was 386.0 minutes. Sleep latency was normal at 19.9 minutes without the use of a sleep aid. REM latency was 123.0 minutes. Arousal index was increased at 35.9 arousals per hour. Sleep efficiency was normal at 86.1%. Wake after sleep onset was 41.5 minutes. The patient spent 12.0% of total sleep time in Stage N1, 41.8% in Stage N2, 25.4% in Stage N3, and 20.7% in REM. Time in REM supine was 16.0 minutes.     Respiration:     Events ? The polysomnogram  revealed a presence of 16 obstructive, 0 central, and 1 mixed apneas resulting in an apnea index of 2.6 events per hour. There were 61 obstructive hypopneas and -0central hypopneas resulting in an obstructive hypopnea index of 9.5 and central hypopnea index of 0 events per hour. The combined apnea/hypopnea index was 12.1 events per hour (central apnea/hypopnea index was 0 events per hour). The REM AHI was 30.0 events per hour. The supine AHI was 18.9 events per hour. The RERA index was 9.8 events per hour.  The RDI was 21.9 events per hour.    Snoring - was reported as mild to moderate    Respiratory rate and pattern - was notable for normal respiratory rate and pattern.    Sustained Sleep Associated Hypoventilation - Transcutaneous carbon dioxide monitoring was not used, however significant hypoventilation was not suggested by oximetry    Sleep Associated Hypoxemia - (Greater than 5 minutes O2 sat at or below 88%) was not present. Baseline oxygen saturation was 92.5%. Lowest oxygen saturation was 82.1%. Time spent less than or equal to 88% was 2.9 minutes. Time spent less than or equal to 89% was 4.8 minutes.     Movement Activity:     Periodic Limb Activity - There were 0 PLMs during the entire study.     REM EMG Activity - Excessive transient/sustained muscle activity was not present.    Nocturnal Behavior - Abnormal sleep related behaviors were not noted     Bruxism - None apparent.     Cardiac Summary:   The average pulse rate was 77.2 bpm. The minimum pulse rate was 35.9 bpm while the maximum pulse rate was 98.0 bpm.  Arrhythmias were not noted.       Past medical/surgical history, family history, social history, medications and allergies were reviewed.      Problem List:  Patient Active Problem List    Diagnosis Date Noted     CLARENCE (obstructive sleep apnea)- mild (AHI 12) 05/24/2021     Priority: Medium     5/21/2021 [facility] Study (273.0 lbs) - AHI 12.1, RDI 21.9, Supine AHI 18.9, REM AHI  30.0, Low O2 82.1%, Time Spent ?88% 2.9 minutes / Time Spent ?89% 4.8 minutes.       Chemical dependency (H) 04/06/2021     Priority: Medium     quit 2001       HTN (hypertension), benign 04/06/2021     Priority: Medium     Nondependent alcohol abuse, in remission 04/06/2021     Priority: Medium     Sober        Tobacco use disorder 04/06/2021     Priority: Medium     Type 2 diabetes mellitus without complication, without long-term current use of insulin (H) 01/07/2021     Priority: Medium     Hypothyroidism, unspecified type 01/07/2021     Priority: Medium     Other diabetic neurological complication associated with type 2 diabetes mellitus (H) 01/07/2021     Priority: Medium     Morbid obesity (H) 01/07/2021     Priority: Medium     Nontoxic multinodular goiter 09/13/2016     Priority: Medium     04/2016: US: Stable multinodular Goiter. Diffuse nodular replacement of both lobes. Complex cystic 2 cm nodule inf left lobe and dominant 2 cm nodule Inf R lobe.  09/2016: US: No change. FNA Lymphocytic thyroiditis       Mixed hyperlipidemia 12/02/2009     Priority: Medium        Impression/Plan:    1. Mild obstructive sleep apnea, moderate by RDI-  Polysomnogram was reviewed in detail today     Counseled the patient that mild sleep apnea is not felt to significantly increase long-term cardiovascular risk, but can contribute to excessive daytime sleepiness.    Treatment options discussed today including, no treatment, auto-CPAP, oral appliance therapy or polysomnography with full night PAP titration.  He reports poor dentition. He wants to consider his options. If he goes with CPAP, I will order auto-CPAP 6-15 cm/H20.    He will follow up with me as needed.     Yolanda Ty PA-C

## 2021-06-03 ENCOUNTER — VIRTUAL VISIT (OUTPATIENT)
Dept: SLEEP MEDICINE | Facility: CLINIC | Age: 56
End: 2021-06-03
Payer: COMMERCIAL

## 2021-06-03 DIAGNOSIS — G47.33 OSA (OBSTRUCTIVE SLEEP APNEA): ICD-10-CM

## 2021-06-03 PROCEDURE — 99214 OFFICE O/P EST MOD 30 MIN: CPT | Mod: 95 | Performed by: PHYSICIAN ASSISTANT

## 2021-08-28 ENCOUNTER — HEALTH MAINTENANCE LETTER (OUTPATIENT)
Age: 56
End: 2021-08-28

## 2021-10-03 DIAGNOSIS — E11.49 OTHER DIABETIC NEUROLOGICAL COMPLICATION ASSOCIATED WITH TYPE 2 DIABETES MELLITUS (H): ICD-10-CM

## 2021-10-03 NOTE — LETTER
October 13, 2021      Rob Cruz  [address] [facility] [place] [address]        Dear Rob,     We have been trying to reach you. Your provider has sent a  irene refill of gabapentin (NEURONTIN) 300 MG capsule. You are due for an appointment for further refills.  Please contact the clinic at [phone] to schedule an appointment.        Sincerely,        Christal Choudhary PA-C

## 2021-10-04 RX ORDER — GABAPENTIN 300 MG/1
CAPSULE ORAL
Qty: 90 CAPSULE | Refills: 0 | Status: SHIPPED | OUTPATIENT
Start: 2021-10-04 | End: 2021-11-01

## 2021-10-04 NOTE — TELEPHONE ENCOUNTER
Routing refill request to provider for review/approval because:  Drug not on the [facility] refill protocol     Sobia Sherman RN  [facility]

## 2021-10-13 NOTE — TELEPHONE ENCOUNTER
Left voice message for pt to give us a callback, please assist with scheduling. Letter sent to address on file.   Elidia PEREZ-

## 2021-10-23 ENCOUNTER — HEALTH MAINTENANCE LETTER (OUTPATIENT)
Age: 56
End: 2021-10-23

## 2021-11-01 ENCOUNTER — OFFICE VISIT (OUTPATIENT)
Dept: FAMILY MEDICINE | Facility: CLINIC | Age: 56
End: 2021-11-01
Payer: COMMERCIAL

## 2021-11-01 VITALS
SYSTOLIC BLOOD PRESSURE: 122 MMHG | HEART RATE: 104 BPM | WEIGHT: 233 LBS | OXYGEN SATURATION: 99 % | BODY MASS INDEX: 32.8 KG/M2 | TEMPERATURE: 98.6 F | DIASTOLIC BLOOD PRESSURE: 64 MMHG

## 2021-11-01 DIAGNOSIS — E03.9 HYPOTHYROIDISM, UNSPECIFIED TYPE: ICD-10-CM

## 2021-11-01 DIAGNOSIS — L60.0 INGROWN TOENAIL: ICD-10-CM

## 2021-11-01 DIAGNOSIS — E11.9 TYPE 2 DIABETES MELLITUS WITHOUT COMPLICATION, WITHOUT LONG-TERM CURRENT USE OF INSULIN (H): Chronic | ICD-10-CM

## 2021-11-01 DIAGNOSIS — E11.49 OTHER DIABETIC NEUROLOGICAL COMPLICATION ASSOCIATED WITH TYPE 2 DIABETES MELLITUS (H): Primary | ICD-10-CM

## 2021-11-01 DIAGNOSIS — F17.200 TOBACCO USE DISORDER: ICD-10-CM

## 2021-11-01 DIAGNOSIS — F19.20 CHEMICAL DEPENDENCY (H): ICD-10-CM

## 2021-11-01 LAB — HBA1C MFR BLD: 6.3 % (ref 0–5.6)

## 2021-11-01 PROCEDURE — 36415 COLL VENOUS BLD VENIPUNCTURE: CPT | Performed by: PHYSICIAN ASSISTANT

## 2021-11-01 PROCEDURE — 99214 OFFICE O/P EST MOD 30 MIN: CPT | Performed by: PHYSICIAN ASSISTANT

## 2021-11-01 PROCEDURE — 83036 HEMOGLOBIN GLYCOSYLATED A1C: CPT | Performed by: PHYSICIAN ASSISTANT

## 2021-11-01 PROCEDURE — 84443 ASSAY THYROID STIM HORMONE: CPT | Performed by: PHYSICIAN ASSISTANT

## 2021-11-01 RX ORDER — GABAPENTIN 300 MG/1
CAPSULE ORAL
Qty: 120 CAPSULE | Refills: 5 | Status: SHIPPED | OUTPATIENT
Start: 2021-11-01

## 2021-11-01 RX ORDER — ATORVASTATIN CALCIUM 20 MG/1
20 TABLET, FILM COATED ORAL DAILY
Qty: 90 TABLET | Refills: 3 | Status: SHIPPED | OUTPATIENT
Start: 2021-11-01

## 2021-11-01 NOTE — PROGRESS NOTES
"  Assessment & Plan     Other diabetic neurological complication associated with type 2 diabetes mellitus (H)  Will do trial of an increase of gabapentin to 900mg at bedtime and 300 qam.   - HEMOGLOBIN A1C; Future  - gabapentin (NEURONTIN) 300 MG capsule; Take 1 tablet in the morning and 3 tablets before bed.    Tobacco use disorder  Encouraged complete cessation.     Type 2 diabetes mellitus without complication, without long-term current use of insulin (H)  Check A1C-great weight loss, he will continue.   - HEMOGLOBIN A1C; Future  - atorvastatin (LIPITOR) 20 MG tablet; Take 1 tablet (20 mg) by mouth daily  - aspirin (ASA) 81 MG EC tablet; Take 1 tablet (81 mg) by mouth daily    Hypothyroidism, unspecified type  Recheck.   - TSH WITH FREE T4 REFLEX; Future    Ingrown toenail  Likely benefit from removal  - Orthopedic  Referral; Future    Chemical dependency (H)  Sober. Congratulated on success.                    Return in about 6 months (around 5/1/2022) for Diabetes.    Christal Choudhary PA-C  [facility]  The student Joanne BEAL acted as a scribe and the encounter documented above was completely performed by myself and the documentation reflects the work I have performed today.   Christal Pope   Rob is a 56 year old who presents for the following health issues     HPI       Patient is here for a diabetes follow up.  He has been working on weight loss through diet - cutting out fast food, cookies, and potato chips.  He has also been very active at work.  He is still sober.  Quit smoking cigarettes 5 months ago, switched to cigars - smokes 2 per day. The gabapentin is \"better than nothing\" and says his neuropathy is improving. No side effects from gabapentin.  His says that his left big toenail is thick and causing discomfort and is unable to cut it at home.       Review of Systems   Constitutional, HEENT, cardiovascular, pulmonary, GI, , musculoskeletal, " neuro, skin, endocrine and psych systems are negative, except as otherwise noted.      Objective    /64   Pulse 104   Temp 98.6  F (37  C) (Oral)   Wt 105.7 kg (233 lb)   SpO2 99%   BMI 32.80 kg/m    Body mass index is 32.8 kg/m .     Physical Exam   GENERAL: healthy, alert and no distress  EYES: Eyes grossly normal to inspection, PERRL and conjunctivae and sclerae normal  HENT: ear canals and TM's normal, nose and mouth without ulcers or lesions  NECK: no adenopathy, no asymmetry, masses, or scars and thyroid normal to palpation  RESP: lungs clear to auscultation - no rales, rhonchi or wheezes  CV: regular rate and rhythm, normal S1 S2, no S3 or S4, no murmur,   ABDOMEN: soft, nontender, no hepatosplenomegaly, no masses and bowel sounds normal  MS: no gross musculoskeletal defects noted, no edema  SKIN: no suspicious lesions or rashes  NEURO: Normal strength and tone, mentation intact and speech normal  PSYCH: mentation appears normal, affect normal/bright  LEFT FOOT: Left thickened big toe nail, growing crooked-pinching skin

## 2021-11-02 LAB — TSH SERPL DL<=0.005 MIU/L-ACNC: 2.92 MU/L (ref 0.4–4)

## 2021-11-02 NOTE — RESULT ENCOUNTER NOTE
Rob,     Your labs look great! We will see you in 6 months. No changes to your medications.   Christal Choudhary PA-C

## 2022-02-12 ENCOUNTER — HEALTH MAINTENANCE LETTER (OUTPATIENT)
Age: 57
End: 2022-02-12

## 2022-04-06 DIAGNOSIS — E11.9 TYPE 2 DIABETES MELLITUS WITHOUT COMPLICATION, WITHOUT LONG-TERM CURRENT USE OF INSULIN (H): ICD-10-CM

## 2022-04-06 DIAGNOSIS — E03.9 HYPOTHYROIDISM, UNSPECIFIED TYPE: ICD-10-CM

## 2022-04-06 DIAGNOSIS — I10 HTN, GOAL BELOW 140/90: ICD-10-CM

## 2022-04-06 RX ORDER — LEVOTHYROXINE SODIUM 75 UG/1
TABLET ORAL
Qty: 90 TABLET | Refills: 0 | Status: SHIPPED | OUTPATIENT
Start: 2022-04-06

## 2022-04-06 RX ORDER — LISINOPRIL 20 MG/1
TABLET ORAL
Qty: 90 TABLET | Refills: 0 | Status: SHIPPED | OUTPATIENT
Start: 2022-04-06

## 2022-04-06 RX ORDER — METFORMIN HCL 500 MG
TABLET, EXTENDED RELEASE 24 HR ORAL
Qty: 360 TABLET | Refills: 0 | Status: SHIPPED | OUTPATIENT
Start: 2022-04-06

## 2022-04-06 RX ORDER — HYDROCHLOROTHIAZIDE 25 MG/1
TABLET ORAL
Qty: 90 TABLET | Refills: 0 | Status: SHIPPED | OUTPATIENT
Start: 2022-04-06

## 2022-04-06 NOTE — TELEPHONE ENCOUNTER
"Prescription approved per [facility] Refill Protocol.  Requested Prescriptions   Pending Prescriptions Disp Refills     levothyroxine (SYNTHROID/LEVOTHROID) 75 MCG tablet [Pharmacy Med Name: Levothyroxine Sodium 75 MCG Oral Tablet] 90 tablet 0     Sig: Take 1 tablet by mouth once daily       Thyroid Protocol Passed - 4/6/2022 10:10 AM        Passed - Patient is 12 years or older        Passed - Recent (12 mo) or future (30 days) visit within the authorizing provider's specialty     Patient has had an office visit with the authorizing provider or a provider within the authorizing providers department within the previous 12 mos or has a future within next 30 days. See \"Patient Info\" tab in inbasket, or \"Choose Columns\" in Meds & Orders section of the refill encounter.              Passed - Medication is active on med list        Passed - Normal TSH on file in past 12 months     Recent Labs   Lab Test 11/01/21  1826   TSH 2.92                 hydrochlorothiazide (HYDRODIURIL) 25 MG tablet [Pharmacy Med Name: hydroCHLOROthiazide 25 MG Oral Tablet] 90 tablet 0     Sig: Take 1 tablet by mouth once daily       Diuretics (Including Combos) Protocol Passed - 4/6/2022 10:10 AM        Passed - Blood pressure under 140/90 in past 12 months     BP Readings from Last 3 Encounters:   11/01/21 122/64   04/06/21 128/74   02/02/21 129/69                 Passed - Recent (12 mo) or future (30 days) visit within the authorizing provider's specialty     Patient has had an office visit with the authorizing provider or a provider within the authorizing providers department within the previous 12 mos or has a future within next 30 days. See \"Patient Info\" tab in inbasket, or \"Choose Columns\" in Meds & Orders section of the refill encounter.              Passed - Medication is active on med list        Passed - Patient is age 18 or older        Passed - Normal serum creatinine on file in past 12 months     Recent Labs   Lab Test 04/06/21  6365 " "  CR 0.96              Passed - Normal serum potassium on file in past 12 months     Recent Labs   Lab Test 04/06/21  1745   POTASSIUM 3.8                    Passed - Normal serum sodium on file in past 12 months     Recent Labs   Lab Test 04/06/21  1745                    metFORMIN (GLUCOPHAGE-XR) 500 MG 24 hr tablet [Pharmacy Med Name: metFORMIN HCl  MG Oral Tablet Extended Release 24 Hour] 360 tablet 0     Sig: TAKE 2 TABLETS BY MOUTH TWICE DAILY WITH MEALS       Biguanide Agents Passed - 4/6/2022 10:10 AM        Passed - Patient is age 10 or older        Passed - Patient has documented A1c within the specified period of time.     If HgbA1C is 8 or greater, it needs to be on file within the past 3 months.  If less than 8, must be on file within the past 6 months.     Recent Labs   Lab Test 11/01/21  1826   A1C 6.3*             Passed - Patient's CR is NOT>1.4 OR Patient's EGFR is NOT<45 within past 12 mos.     Recent Labs   Lab Test 04/06/21  1745   GFRESTIMATED 87   GFRESTBLACK >90       Recent Labs   Lab Test 04/06/21  1745   CR 0.96             Passed - Patient does NOT have a diagnosis of CHF.        Passed - Medication is active on med list        Passed - Recent (6 mo) or future (30 days) visit within the authorizing provider's specialty     Patient had office visit in the last 6 months or has a visit in the next 30 days with authorizing provider or within the authorizing provider's specialty.  See \"Patient Info\" tab in inbasket, or \"Choose Columns\" in Meds & Orders section of the refill encounter.               lisinopril (ZESTRIL) 20 MG tablet [Pharmacy Med Name: Lisinopril 20 MG Oral Tablet] 90 tablet 0     Sig: Take 1 tablet by mouth once daily       ACE Inhibitors (Including Combos) Protocol Passed - 4/6/2022 10:10 AM        Passed - Blood pressure under 140/90 in past 12 months     BP Readings from Last 3 Encounters:   11/01/21 122/64   04/06/21 128/74   02/02/21 129/69                 " "Passed - Recent (12 mo) or future (30 days) visit within the authorizing provider's specialty     Patient has had an office visit with the authorizing provider or a provider within the authorizing providers department within the previous 12 mos or has a future within next 30 days. See \"Patient Info\" tab in inbasket, or \"Choose Columns\" in Meds & Orders section of the refill encounter.              Passed - Medication is active on med list        Passed - Patient is age 18 or older        Passed - Normal serum creatinine on file in past 12 months     Recent Labs   Lab Test 04/06/21  1745   CR 0.96       Ok to refill medication if creatinine is low          Passed - Normal serum potassium on file in past 12 months     Recent Labs   Lab Test 04/06/21  1745   POTASSIUM 3.8                  "

## 2022-06-04 ENCOUNTER — HEALTH MAINTENANCE LETTER (OUTPATIENT)
Age: 57
End: 2022-06-04

## 2022-10-09 ENCOUNTER — HEALTH MAINTENANCE LETTER (OUTPATIENT)
Age: 57
End: 2022-10-09

## 2023-02-18 ENCOUNTER — HEALTH MAINTENANCE LETTER (OUTPATIENT)
Age: 58
End: 2023-02-18

## 2023-05-27 ENCOUNTER — HEALTH MAINTENANCE LETTER (OUTPATIENT)
Age: 58
End: 2023-05-27

## 2023-10-28 ENCOUNTER — HEALTH MAINTENANCE LETTER (OUTPATIENT)
Age: 58
End: 2023-10-28

## 2024-03-16 ENCOUNTER — HEALTH MAINTENANCE LETTER (OUTPATIENT)
Age: 59
End: 2024-03-16

## 2024-08-03 ENCOUNTER — HEALTH MAINTENANCE LETTER (OUTPATIENT)
Age: 59
End: 2024-08-03